# Patient Record
Sex: MALE | Race: BLACK OR AFRICAN AMERICAN | NOT HISPANIC OR LATINO | URBAN - METROPOLITAN AREA
[De-identification: names, ages, dates, MRNs, and addresses within clinical notes are randomized per-mention and may not be internally consistent; named-entity substitution may affect disease eponyms.]

---

## 2023-11-10 ENCOUNTER — EMERGENCY (EMERGENCY)
Facility: HOSPITAL | Age: 83
LOS: 0 days | Discharge: HOME | End: 2023-11-10
Attending: EMERGENCY MEDICINE
Payer: MEDICARE

## 2023-11-10 VITALS
OXYGEN SATURATION: 99 % | HEIGHT: 68 IN | WEIGHT: 173.06 LBS | DIASTOLIC BLOOD PRESSURE: 81 MMHG | SYSTOLIC BLOOD PRESSURE: 150 MMHG | HEART RATE: 67 BPM | TEMPERATURE: 97 F | RESPIRATION RATE: 17 BRPM

## 2023-11-10 DIAGNOSIS — H57.11 OCULAR PAIN, RIGHT EYE: ICD-10-CM

## 2023-11-10 DIAGNOSIS — H57.89 OTHER SPECIFIED DISORDERS OF EYE AND ADNEXA: ICD-10-CM
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
## 2023-11-10 DIAGNOSIS — S05.01XA INJURY OF CONJUNCTIVA AND CORNEAL ABRASION WITHOUT FOREIGN BODY, RIGHT EYE, INITIAL ENCOUNTER: ICD-10-CM

## 2023-11-10 DIAGNOSIS — H10.9 UNSPECIFIED CONJUNCTIVITIS: ICD-10-CM
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
## 2023-11-10 PROCEDURE — 99284 EMERGENCY DEPT VISIT MOD MDM: CPT

## 2023-11-10 RX ORDER — OFLOXACIN 0.3 %
1 DROPS OPHTHALMIC (EYE) ONCE
Refills: 0 | Status: COMPLETED | OUTPATIENT
Start: 2023-11-10 | End: 2023-11-10

## 2023-11-10 RX ORDER — OFLOXACIN 0.3 %
1 DROPS OPHTHALMIC (EYE)
Qty: 5 | Refills: 0
Start: 2023-11-10 | End: 2023-11-19

## 2023-11-10 RX ADMIN — Medication 1 DROP(S): at 17:13

## 2023-11-10 NOTE — ED PROVIDER NOTE - CLINICAL SUMMARY MEDICAL DECISION MAKING FREE TEXT BOX
pt came in with a hx of glaucoma on eye drops bid daily c/o right eye light sensitivities, yellow discharge and redness since last night denies trauma, headache, dizziness fever, chills, cough, sob, chest pain, fever, chills, abd pain, intraocular pressure was 20 in both eyes, + fluorescent up take to the right corneal pt's symptoms most likely due to conjunctivitis not acute closure glaucoma.

## 2023-11-10 NOTE — ED ADULT NURSE NOTE - NSFALLUNIVINTERV_ED_ALL_ED
Bed/Stretcher in lowest position, wheels locked, appropriate side rails in place/Call bell, personal items and telephone in reach/Instruct patient to call for assistance before getting out of bed/chair/stretcher/Non-slip footwear applied when patient is off stretcher/[place] to call system/Physically safe environment - no spills, clutter or unnecessary equipment/Purposeful proactive rounding/Room/bathroom lighting operational, light cord in reach

## 2023-11-10 NOTE — ED ADULT NURSE NOTE - OBJECTIVE STATEMENT
Patient AOx3, responsive, ambulatory, wife at bedside. Pt c/o sudden severe pain to R eye with "sticky fluid drainage" from the eye and redness starting last night, hx of glaucoma, states compliant with eyedrop medication. c/o blurry vision to R eye denies vision loss; wearing prescription glasses. denies CP/SOB/headache, dizziness, fever/chills, n/v/d, injury/trauma. NKA. hx glaucoma, cataracts, DM.

## 2023-11-10 NOTE — ED PROVIDER NOTE - NSFOLLOWUPINSTRUCTIONS_ED_ALL_ED_FT
Please see your eye doctor as soon as possible ofloxcin eye drop 1 drop 4 time a day for 10 days, return if symptoms persist or worsen.

## 2023-11-10 NOTE — ED ADULT TRIAGE NOTE - CHIEF COMPLAINT QUOTE
Patient accompanied by wife comes to ED with right eye pain since last night. Patient states eyes are slight blurry with clear drainage. Denies injury or trauma.  hx glaucoma, cataracts, DM

## 2023-11-10 NOTE — ED PROVIDER NOTE - PATIENT PORTAL LINK FT
You can access the FollowMyHealth Patient Portal offered by [facility] by registering at the following website: [email or web address]. By joining [address] FollowMyHealth portal, you will also be able to view your health information using other applications (apps) compatible with our system.

## 2023-11-10 NOTE — ED PROVIDER NOTE - OBJECTIVE STATEMENT
83 years old male walked in with his wife c/o redness pain light sensitivities, and yellow discharge from the right eye since last night. Pt has a hx of bilateral glaucoma taking eye drops bid. Pt has an ophthalmologist and unable to see the eye doctor until Monday. Pt denies recent hx of trauma headache, dizziness, neck/back, cough, sob, chest pain, nausea, vomiting, fever, chills, abd pain.

## 2023-11-10 NOTE — ED PROVIDER NOTE - CARE PLAN
1 Principal Discharge DX:	Infection of conjunctiva of right eye   Principal Discharge DX:	Infection of conjunctiva of right eye  Secondary Diagnosis:	Injury of conjunctiva and corneal abrasion without foreign body, right eye, initial encounter

## 2023-11-10 NOTE — ED PROVIDER NOTE - PHYSICAL EXAMINATION
right eye conjunctiva injected with yellow discharge + round fluorescent up take to the center of cornea about 0.01 cm in diameter and + photophobia intraocular pressure is 20 and left eye conjunctiva clear intraocular pressure is also 20, bilateral pupils are round and equally reactive to light bilaterally., extraocular ocular motor are intact bilaterally no foreign bodies noted in upper and lower inner eye lids

## 2024-02-12 NOTE — ED ADULT NURSE NOTE - PAIN RATING/NUMBER SCALE (0-10): ACTIVITY
[facility] needs verbal orders for  RN, PT and OT to resume post hospital visit.     7 (severe pain)

## 2025-01-10 ENCOUNTER — EMERGENCY (EMERGENCY)
Facility: HOSPITAL | Age: 85
LOS: 1 days | Discharge: LEFT BEFORE DISCHARGE | End: 2025-01-10
Admitting: EMERGENCY MEDICINE
Payer: MEDICARE

## 2025-01-10 VITALS
OXYGEN SATURATION: 100 % | RESPIRATION RATE: 16 BRPM | SYSTOLIC BLOOD PRESSURE: 152 MMHG | HEART RATE: 99 BPM | WEIGHT: 177.03 LBS | TEMPERATURE: 98 F | DIASTOLIC BLOOD PRESSURE: 81 MMHG

## 2025-01-10 PROCEDURE — L9991: CPT

## 2025-01-10 NOTE — ED ADULT TRIAGE NOTE - CHIEF COMPLAINT QUOTE
Pt c/o 1 episode of rectal bleeding. per family pt went to the bathroom and notices bright red blood int he toilet. pt has hx of hemorrhoids. denies blood thinner use. No complaints of chest pain, headache, nausea, dizziness, vomiting  SOB, fever, chills verbalized.

## 2025-01-11 PROBLEM — I10 ESSENTIAL (PRIMARY) HYPERTENSION: Chronic | Status: ACTIVE | Noted: 2023-11-10

## 2025-01-11 PROBLEM — H40.9 UNSPECIFIED GLAUCOMA: Chronic | Status: ACTIVE | Noted: 2023-11-10

## 2025-01-11 PROBLEM — E11.9 TYPE 2 DIABETES MELLITUS WITHOUT COMPLICATIONS: Chronic | Status: ACTIVE | Noted: 2023-11-10

## 2025-05-19 NOTE — ED ADULT NURSE NOTE - HOW OFTEN DO YOU HAVE A DRINK CONTAINING ALCOHOL?
Problem: Discharge Planning  Goal: Discharge to home or other facility with appropriate resources  Outcome: Progressing     Problem: Pain  Goal: Verbalizes/displays adequate comfort level or baseline comfort level  Outcome: Progressing     Problem: Skin/Tissue Integrity  Goal: Skin integrity remains intact  Description: 1.  Monitor for areas of redness and/or skin breakdown2.  Assess vascular access sites hourly3.  Every 4-6 hours minimum:  Change oxygen saturation probe site4.  Every 4-6 hours:  If on nasal continuous positive airway pressure, respiratory therapy assess nares and determine need for appliance change or resting period  Outcome: Progressing     Problem: Safety - Adult  Goal: Free from fall injury  Outcome: Progressing      Never